# Patient Record
Sex: FEMALE | Race: WHITE | NOT HISPANIC OR LATINO | URBAN - METROPOLITAN AREA
[De-identification: names, ages, dates, MRNs, and addresses within clinical notes are randomized per-mention and may not be internally consistent; named-entity substitution may affect disease eponyms.]

---

## 2021-09-01 ENCOUNTER — APPOINTMENT (OUTPATIENT)
Dept: URBAN - METROPOLITAN AREA CLINIC 212 | Age: 31
Setting detail: DERMATOLOGY
End: 2021-09-09

## 2021-09-01 VITALS — TEMPERATURE: 97.2 F

## 2021-09-01 DIAGNOSIS — L21.8 OTHER SEBORRHEIC DERMATITIS: ICD-10-CM

## 2021-09-01 PROCEDURE — 99203 OFFICE O/P NEW LOW 30 MIN: CPT

## 2021-09-01 PROCEDURE — OTHER COUNSELING: OTHER

## 2021-09-01 PROCEDURE — OTHER MIPS QUALITY: OTHER

## 2021-09-01 PROCEDURE — OTHER TREATMENT REGIMEN: OTHER

## 2021-09-01 PROCEDURE — OTHER PRESCRIPTION: OTHER

## 2021-09-01 RX ORDER — KETOCONAZOLE 20 MG/G
CREAM TOPICAL
Qty: 15 | Refills: 2 | Status: ERX | COMMUNITY
Start: 2021-09-01

## 2021-09-01 NOTE — HPI: RASH
What Type Of Note Output Would You Prefer (Optional)?: Bullet Format
Is The Patient Presenting As Previously Scheduled?: No, they are a work-in
How Severe Is Your Rash?: moderate
Is This A New Presentation, Or A Follow-Up?: Rash
Additional History: Patient states pain level is a 0/10

## 2021-09-01 NOTE — PROCEDURE: MIPS QUALITY
Quality 110: Preventive Care And Screening: Influenza Immunization: Influenza Immunization previously received during influenza season
Quality 431: Preventive Care And Screening: Unhealthy Alcohol Use - Screening: Patient screened for unhealthy alcohol use using a single question and scores less than 2 times per year
Detail Level: Detailed
Quality 431: Preventive Care And Screening: Unhealthy Alcohol Use - Screening: Patient not screened for unhealthy alcohol use using a systematic screening method or patient did not receive brief counseling if identified as an unhealthy alcohol user, reason not given
Quality 226: Preventive Care And Screening: Tobacco Use: Screening And Cessation Intervention: Patient screened for tobacco use and is an ex/non-smoker
Quality 130: Documentation Of Current Medications In The Medical Record: Current Medications Documented
Yes

## 2021-09-01 NOTE — PROCEDURE: TREATMENT REGIMEN
Detail Level: Zone
Initiate Treatment: ketoconazole 2 % topical cream. Apply to affected areas every night for two weeks and then as needed for flares. (Local)
Plan: Pt aware to review with obgyn before beginning to use ketoconazole cream. May also use metronidazole or OTC Head and Shoulders shampoo if ok by OB/GYN
ss insulin coverage  check a1c

## 2022-03-22 ENCOUNTER — APPOINTMENT (OUTPATIENT)
Dept: URBAN - METROPOLITAN AREA CLINIC 212 | Age: 32
Setting detail: DERMATOLOGY
End: 2022-03-23

## 2022-03-22 DIAGNOSIS — L71.0 PERIORAL DERMATITIS: ICD-10-CM

## 2022-03-22 PROCEDURE — OTHER TREATMENT REGIMEN: OTHER

## 2022-03-22 PROCEDURE — OTHER PRESCRIPTION SAMPLES PROVIDED: OTHER

## 2022-03-22 PROCEDURE — OTHER PRESCRIPTION: OTHER

## 2022-03-22 PROCEDURE — OTHER MIPS QUALITY: OTHER

## 2022-03-22 PROCEDURE — OTHER COUNSELING: OTHER

## 2022-03-22 PROCEDURE — 99214 OFFICE O/P EST MOD 30 MIN: CPT

## 2022-03-22 RX ORDER — DOXYCYCLINE 40 MG/1
CAPSULE ORAL
Qty: 30 | Refills: 2 | Status: ERX | COMMUNITY
Start: 2022-03-22

## 2022-03-22 RX ORDER — PIMECROLIMUS 10 MG/G
CREAM TOPICAL
Qty: 1 | Refills: 2 | Status: ERX | COMMUNITY
Start: 2022-03-22

## 2022-03-22 ASSESSMENT — LOCATION SIMPLE DESCRIPTION DERM: LOCATION SIMPLE: RIGHT CHEEK

## 2022-03-22 ASSESSMENT — LOCATION ZONE DERM: LOCATION ZONE: FACE

## 2022-03-22 ASSESSMENT — LOCATION DETAILED DESCRIPTION DERM: LOCATION DETAILED: RIGHT INFERIOR CENTRAL MALAR CHEEK

## 2022-03-22 NOTE — PROCEDURE: TREATMENT REGIMEN
Initiate Treatment: 1) Oracea 40 mg capsule,immediate - delay release \\nQuantity: 30.0 Capsule  Days Supply: 30\\nSig: Take one capsule daily with plenty of food and water.\\n\\n2) Elidel 1 % topical cream \\nQuantity: 1.0 Unspecified  Days Supply: 30\\nSig: Apply to affected rash areas twice daily.
Detail Level: Zone

## 2022-03-23 RX ORDER — DOXYCYCLINE 40 MG/1
CAPSULE ORAL
Qty: 30 | Refills: 2

## 2022-03-23 RX ORDER — PIMECROLIMUS 10 MG/G
CREAM TOPICAL
Qty: 1 | Refills: 2